# Patient Record
(demographics unavailable — no encounter records)

---

## 2025-04-14 NOTE — PLAN
[FreeTextEntry1] : Pap UTD. Discussed cycle tracking and timing of ovulation and to start PNV 2-3 mo before ttc. Given elev PRL in the past recommend repeating level and discussed fertility implications of elev prolactin. Preconception labs advised as well. Pt interested in [facility] testing.  Patient screened for depression - no signs of clinical depression. PHQ-9 scores reviewed over the course of the visit 5-10minutes of face to face time. Follow up with changes in mood including other symptoms of anxiety.

## 2025-04-14 NOTE — HISTORY OF PRESENT ILLNESS
[FreeTextEntry1] : 32yo G0 here for wellness, last seen 4/2024. Hx of irreg menses q6-7 weeks and found to have elev PRL and elev testosterone, saw endo and started on cabergoline in 2023, MRI 3/2023 with possible 5mm microadenoma. Did not f/u with endo and was briefly on cabergoline, but now periods more reg q4-5 weeks. Considering ttc later this fall. Currently using protection. Works as pharmacist.  Pap 2/2023 lila

## 2025-04-14 NOTE — PHYSICAL EXAM
[Chaperone Declined] : Chaperone offered however refused by patient, [Appropriately responsive] : appropriately responsive [Alert] : alert [No Acute Distress] : no acute distress [Soft] : soft [Non-tender] : non-tender [Non-distended] : non-distended [No HSM] : No HSM [No Lesions] : no lesions [No Mass] : no mass [Oriented x3] : oriented x3 [Examination Of The Breasts] : a normal appearance [No Masses] : no breast masses were palpable [Labia Majora] : normal [Labia Minora] : normal [Normal] : normal [Uterine Adnexae] : normal